# Patient Record
Sex: FEMALE | Race: OTHER | NOT HISPANIC OR LATINO | URBAN - METROPOLITAN AREA
[De-identification: names, ages, dates, MRNs, and addresses within clinical notes are randomized per-mention and may not be internally consistent; named-entity substitution may affect disease eponyms.]

---

## 2019-02-27 ENCOUNTER — EMERGENCY (EMERGENCY)
Facility: HOSPITAL | Age: 39
LOS: 1 days | Discharge: HOME | End: 2019-02-27
Attending: EMERGENCY MEDICINE | Admitting: EMERGENCY MEDICINE
Payer: COMMERCIAL

## 2019-02-27 VITALS
RESPIRATION RATE: 18 BRPM | WEIGHT: 226.41 LBS | TEMPERATURE: 98 F | DIASTOLIC BLOOD PRESSURE: 60 MMHG | HEIGHT: 65 IN | HEART RATE: 69 BPM | OXYGEN SATURATION: 99 % | SYSTOLIC BLOOD PRESSURE: 120 MMHG

## 2019-02-27 DIAGNOSIS — Z79.899 OTHER LONG TERM (CURRENT) DRUG THERAPY: ICD-10-CM
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
## 2019-02-27 DIAGNOSIS — S49.91XA UNSPECIFIED INJURY OF RIGHT SHOULDER AND UPPER ARM, INITIAL ENCOUNTER: ICD-10-CM

## 2019-02-27 DIAGNOSIS — M25.511 PAIN IN RIGHT SHOULDER: ICD-10-CM

## 2019-02-27 DIAGNOSIS — S89.92XA UNSPECIFIED INJURY OF LEFT LOWER LEG, INITIAL ENCOUNTER: ICD-10-CM
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
## 2019-02-27 PROCEDURE — 99283 EMERGENCY DEPT VISIT LOW MDM: CPT | Mod: 25

## 2019-02-27 PROCEDURE — 99284 EMERGENCY DEPT VISIT MOD MDM: CPT

## 2019-02-27 PROCEDURE — 73562 X-RAY EXAM OF KNEE 3: CPT | Mod: 26,LT

## 2019-02-27 PROCEDURE — 73030 X-RAY EXAM OF SHOULDER: CPT | Mod: 26,RT

## 2019-02-27 PROCEDURE — 73030 X-RAY EXAM OF SHOULDER: CPT

## 2019-02-27 PROCEDURE — 73562 X-RAY EXAM OF KNEE 3: CPT

## 2019-02-27 PROCEDURE — 73562 X-RAY EXAM OF KNEE 3: CPT | Mod: 26

## 2019-02-27 PROCEDURE — 73030 X-RAY EXAM OF SHOULDER: CPT | Mod: 26

## 2019-02-27 RX ORDER — ACETAMINOPHEN 500 MG
650 TABLET ORAL ONCE
Qty: 0 | Refills: 0 | Status: COMPLETED | OUTPATIENT
Start: 2019-02-27 | End: 2019-02-27

## 2019-02-27 RX ADMIN — Medication 650 MILLIGRAM(S): at 18:29

## 2019-02-27 NOTE — ED PROVIDER NOTE - NSFOLLOWUPINSTRUCTIONS_ED_ALL_ED_FT
Knee Sprain, Adult  ImageA knee sprain is a stretch or tear in a knee ligament. Knee ligaments are bands of tissue that connect bones in the knee to each other.    What are the causes?  This condition often results from:    A fall.  An injury to the knee.    What are the signs or symptoms?  Symptoms of this condition include:    Trouble bending the leg.  Swelling in the knee.  Bruising around the knee.  Tenderness or pain in the knee.  Muscle spasms around the knee.    How is this diagnosed?  This condition may be diagnosed based on:    A physical exam.  What happened just before you started to have symptoms.  Tests, including:    An X-ray. This may be done to make sure no bones are broken.  An MRI. This may be done to check if the ligament is torn.  Stress testing of the knee. This may be done to check ligament damage.      How is this treated?  Treatment for this condition may involve:    Keeping the knee still (immobilized) with a cast, brace, or splint.  Applying ice to the knee. This helps with pain and swelling.  Keeping the knee raised (elevated) above the level of your heart when you are resting. This helps with pain and swelling.  Taking medicine for pain.  Exercises to prevent or limit permanent weakness or stiffness in your knee.  Surgery to reconnect the ligament to the bone or to reconstruct it. This may be needed if the ligament tore all the way.    Follow these instructions at home:  If you have a splint or brace:     Wear the splint or brace as told by your health care provider. Remove it only as told by your health care provider.  Loosen the splint or brace if your toes tingle, become numb, or turn cold and blue.  Keep the splint or brace clean.  If the splint or brace is not waterproof:    Do not let it get wet.  Cover it with a watertight covering when you take a bath or a shower.    If you have a cast:     Do not stick anything inside the cast to scratch your skin. Doing that increases your risk of infection.  Check the skin around the cast every day. Tell your health care provider about any concerns.  You may put lotion on dry skin around the edges of the cast. Do not put lotion on the skin underneath the cast.  Keep the cast clean.  If the cast is not waterproof:    Do not let it get wet.  Cover it with a watertight covering when you take a bath or a shower.    Managing pain, stiffness, and swelling     Image   If directed, put ice on the injured area.    If you have a removable splint or brace, remove it as told by your health care provider.  Put ice in a plastic bag.  Place a towel between your skin and the bag or between your cast and the bag.  Leave the ice on for 20 minutes, 2–3 times a day.    Gently move your toes often to avoid stiffness and to lessen swelling.  Elevate the injured area above the level of your heart while you are sitting or lying down.  Take over-the-counter and prescription medicines only as told by your health care provider.  General instructions     Do exercises as told by your health care provider.  Keep all follow-up visits as told by your health care provider. This is important.  Contact a health care provider if:  You have pain that gets worse.  The cast, brace, or splint does not fit right.  The cast, brace, or splint gets damaged.  Get help right away if:  You cannot use your injured joint to support any of your body weight (cannot bear weight).  You cannot move the injured joint.  You cannot walk more than a few steps without pain or without your knee buckling.  You have significant pain, swelling, or numbness below the cast, brace, or splint.  er.      Contusion    A contusion is a deep bruise. Contusions are the result of a blunt injury to tissues and muscle fibers under the skin. The skin overlying the contusion may turn blue, purple, or yellow. Symptoms also include pain and swelling in the injured area.    SEEK IMMEDIATE MEDICAL CARE IF YOU HAVE ANY OF THE FOLLOWING SYMPTOMS: severe pain, numbness, tingling, pain, weakness, or skin color/temperature change in any part of your body distal to the injury.

## 2019-02-27 NOTE — ED PROVIDER NOTE - OBJECTIVE STATEMENT
39yo F denies PMH was in a store and slipped on wet floor. fell primarily onto L knee, now swollen though no break in skin, and then fell onto her R hand, feeling pain in R shoulder. No pain in hand/wrist. shoulder pushed back, but did not dislocate. Able to ambulate but limping. went to work but pain increasing, so came here. took aleve for the pain. +swelling to lateral inferior portion of knee joint. never hit head/spine. no preceding dizziness or syncope.

## 2019-02-27 NOTE — ED PROVIDER NOTE - CLINICAL SUMMARY MEDICAL DECISION MAKING FREE TEXT BOX
here w/ slip and fall in store w/ resultant knee effusion and pain, and shoulder pain. xrays w/ no acute fx seen. plan for pain control with otc medications. DC home in NAD with strict return precautions given.
oriented to person, place, time and situation

## 2019-02-27 NOTE — ED ADULT NURSE NOTE - CAS EDN INTEG ASSESS
· Return to clinic or follow up with PCP for further evaluation if symptoms are not improved within 48-72 hours  · Go to ER if facial or periorbital swelling develops  · Humidify the air  · Increase fluid intake  · Steam inhalation and warm compresses ofte
WDL

## 2019-02-27 NOTE — ED PROVIDER NOTE - CARE PLAN
Principal Discharge DX:	Knee injury, left, initial encounter  Secondary Diagnosis:	Shoulder injury, right, initial encounter

## 2019-02-27 NOTE — ED ADULT NURSE NOTE - OBJECTIVE STATEMENT
Patient reports mechanical fall from puddle of water and bracing with knee and hitting shoulder.  Full ROM, full weight baring.

## 2019-02-27 NOTE — ED PROVIDER NOTE - PHYSICAL EXAMINATION
CONSTITUTIONAL: Well-appearing; well-nourished; in no apparent distress.   HEAD: Normocephalic; atraumatic.   EYES:  conjunctiva and sclera clear  ENT: normal nose; no rhinorrhea; normal pharynx with no erythema or lesions.   NECK: Supple; non-tender;   CARDIOVASCULAR: Normal S1, S2; no murmurs, rubs, or gallops. Regular rate and rhythm.   RESPIRATORY: Breathing easily; breath sounds clear and equal bilaterally; no wheezes, rhonchi, or rales.  GI: Soft; non-distended; non-tender; no palpable organomegaly.   EXT: No cyanosis or edema; N/V intact. L shoulder w/ full ROM, non tender to palpation. R knee +effusion to lateral inferior portion of knee joint and tender there. no tenderness over patella or prox tibia.   SKIN: Normal for age and race; warm; dry; good turgor; no apparent lesions or rash.   NEURO: A & O x 3; face symmetric; grossly unremarkable.   PSYCHOLOGICAL: The patient’s mood and manner are appropriate.

## 2019-02-27 NOTE — ED PROVIDER NOTE - IMAGING STUDIES ADDITIONAL INFORMATION FREE TEXT
Xray Left knee  ER Physician: Minal Villafana  INTERPRETATION:  no acute fracture; no soft tissue swelling noted; normal bony alignment.     Xray R shoulder  ER Physician: Minal Villafana  INTERPRETATION:  no acute fracture; no soft tissue swelling noted; normal bony alignment.

## 2019-04-14 ENCOUNTER — EMERGENCY (EMERGENCY)
Facility: HOSPITAL | Age: 39
LOS: 1 days | Discharge: HOME | End: 2019-04-14
Attending: EMERGENCY MEDICINE | Admitting: EMERGENCY MEDICINE
Payer: COMMERCIAL

## 2019-04-14 VITALS
OXYGEN SATURATION: 100 % | HEART RATE: 76 BPM | DIASTOLIC BLOOD PRESSURE: 75 MMHG | HEIGHT: 65 IN | RESPIRATION RATE: 19 BRPM | TEMPERATURE: 98 F | SYSTOLIC BLOOD PRESSURE: 111 MMHG | WEIGHT: 220.02 LBS

## 2019-04-14 PROCEDURE — 99284 EMERGENCY DEPT VISIT MOD MDM: CPT | Mod: 25

## 2019-04-14 RX ORDER — DIPHENHYDRAMINE HCL 50 MG
50 CAPSULE ORAL ONCE
Qty: 0 | Refills: 0 | Status: COMPLETED | OUTPATIENT
Start: 2019-04-14 | End: 2019-04-14

## 2019-04-14 RX ORDER — METOCLOPRAMIDE HCL 10 MG
10 TABLET ORAL ONCE
Qty: 0 | Refills: 0 | Status: COMPLETED | OUTPATIENT
Start: 2019-04-14 | End: 2019-04-14

## 2019-04-14 RX ORDER — ACETAMINOPHEN 500 MG
650 TABLET ORAL ONCE
Qty: 0 | Refills: 0 | Status: COMPLETED | OUTPATIENT
Start: 2019-04-14 | End: 2019-04-14

## 2019-04-14 NOTE — ED ADULT NURSE NOTE - OBJECTIVE STATEMENT
pt c/o intermittent right sided headache with numbness in both arms, blurry vision, photophobia for last 2 weeks.   also c/o intermittent chest pain that she thinks is related to work stress.  pt developed a small ecchymotic lump on her left AC today.  denies injury or pain there.  no neuro deficits.  no n/v.  gait steady.

## 2019-04-14 NOTE — ED PROVIDER NOTE - PHYSICAL EXAMINATION
CONSTITUTIONAL: Well-appearing; well-nourished; in no apparent distress.   HEAD: Normocephalic; atraumatic.   EYES: PERRL; EOM intact; conjunctiva and sclera clear  ENT: normal nose; no rhinorrhea; normal pharynx with no erythema or lesions.   NECK: Supple; non-tender; no LAD  CARDIOVASCULAR: Normal S1, S2; no murmurs, rubs, or gallops. Regular rate and rhythm.   RESPIRATORY: Breathing easily; breath sounds clear and equal bilaterally; no wheezes, rhonchi, or rales.  GI: Soft; non-distended; non-tender; no palpable organomegaly.   MSK: FROM at all extremities, normal tone   EXT: No cyanosis or edema; N/V intact  SKIN: Normal for age and race; warm; dry; good turgor; no apparent lesions or rash.   NEURO: A & O x 3; face symmetric; grossly unremarkable. CN 2-12 intact, normal finger to nose, no pronator drift, sensation intact, strength 5/5 b/l   PSYCHOLOGICAL: The patient’s mood and manner are appropriate.

## 2019-04-14 NOTE — ED PROVIDER NOTE - OBJECTIVE STATEMENT
37 yo F with pmh of gastric sleeve 5/18 c/o R sided HA x 2 weeks. Pt reports HAs are gradual in onset and throbbing. Lasting hours. Sometimes sleep helps. Associated with some photophobia. Pt reports intermittent pins/needles in her b/l arms but no tingling now. Denies fever, chills, neck pain, focal weakness, slurred speech, head trauma. Pt took motrin with no relief. Pt states she has never had HAs like this before.

## 2019-04-14 NOTE — ED ADULT TRIAGE NOTE - OTHER COMPLAINTS
CC of headache x 2 weeks R sided, numbness on both arms. took Motrin yesterday but to no avail. 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

## 2019-04-14 NOTE — ED PROVIDER NOTE - CLINICAL SUMMARY MEDICAL DECISION MAKING FREE TEXT BOX
39 yo F with pmh of gastric sleeve 5/18 c/o R sided HA x 2 weeks. Pt reports HAs are gradual in onset and throbbing. Lasting hours. +intermittent tingling in arms. No focal weakness. VSS. Well appearing. Neck supple. Neurologically intact.

## 2019-04-14 NOTE — ED PROVIDER NOTE - ATTENDING CONTRIBUTION TO CARE
38F c/o R sided HA for past 2 wks. states gradually worsening. +photophobia, intermittent tinlging to b/l arms. no neck pain. no dizziness. no chest pain.  gen- nad  heent- ncat, clear conj  cv -rrr  lungs -ctab  abd - soft, nt, nd  ext -wwp, no edema  neuro -aox3, steady gait, chen  no focal neuro deficits, no sudden onset, not maximal at onset, ct head neg, doubt SAH. recommend neuro f/u

## 2019-04-15 ENCOUNTER — INBOUND DOCUMENT (OUTPATIENT)
Age: 39
End: 2019-04-15

## 2019-04-15 PROCEDURE — 70450 CT HEAD/BRAIN W/O DYE: CPT | Mod: 26

## 2019-04-15 PROCEDURE — 96375 TX/PRO/DX INJ NEW DRUG ADDON: CPT

## 2019-04-15 PROCEDURE — 96374 THER/PROPH/DIAG INJ IV PUSH: CPT

## 2019-04-15 PROCEDURE — 99284 EMERGENCY DEPT VISIT MOD MDM: CPT | Mod: 25

## 2019-04-15 PROCEDURE — 70450 CT HEAD/BRAIN W/O DYE: CPT

## 2019-04-15 RX ADMIN — Medication 10 MILLIGRAM(S): at 00:10

## 2019-04-15 RX ADMIN — Medication 650 MILLIGRAM(S): at 00:37

## 2019-04-15 RX ADMIN — Medication 104 MILLIGRAM(S): at 00:03

## 2019-04-15 RX ADMIN — Medication 50 MILLIGRAM(S): at 00:03

## 2019-04-15 RX ADMIN — Medication 650 MILLIGRAM(S): at 00:03

## 2019-04-18 DIAGNOSIS — R51 HEADACHE: ICD-10-CM

## 2019-04-18 DIAGNOSIS — H53.149 VISUAL DISCOMFORT, UNSPECIFIED: ICD-10-CM

## 2019-04-18 DIAGNOSIS — Z79.899 OTHER LONG TERM (CURRENT) DRUG THERAPY: ICD-10-CM

## 2019-04-18 DIAGNOSIS — R20.2 PARESTHESIA OF SKIN: ICD-10-CM

## 2020-01-08 ENCOUNTER — RESULT REVIEW (OUTPATIENT)
Age: 40
End: 2020-01-08

## 2023-02-08 NOTE — ED ADULT TRIAGE NOTE - LOCATION:
What Is The Reason For Today's Visit?: Initial Full Body Skin Examination
What Is The Reason For Today's Visit? (Being Monitored For X): the development of new lesions
Additional History: \\nPatient is here for a full skin check, she has some concerns with some rough and scaly spots on her arms. \\n\\nShe also wants a good look over due to her having an immunosuppressive disorder.
Left arm;

## 2023-04-23 NOTE — ED ADULT NURSE NOTE - NSFALLRSKASSESSDT_ED_ALL_ED
Notified by RN pt's SBP in the 70s and 80s. Reviewed cardiology day shift rounding notes, pt has been hypotensive throughout the day. Advised RN per cardiology note from earlier today, continue to monitor BP. 14-Apr-2019 23:40